# Patient Record
Sex: FEMALE | Race: WHITE | NOT HISPANIC OR LATINO | Employment: STUDENT | URBAN - METROPOLITAN AREA
[De-identification: names, ages, dates, MRNs, and addresses within clinical notes are randomized per-mention and may not be internally consistent; named-entity substitution may affect disease eponyms.]

---

## 2021-08-26 ENCOUNTER — EH NON-PROVIDER (OUTPATIENT)
Dept: OCCUPATIONAL MEDICINE | Facility: CLINIC | Age: 21
End: 2021-08-26

## 2021-08-26 DIAGNOSIS — Z02.89 ENCOUNTER FOR OCCUPATIONAL HEALTH ASSESSMENT: ICD-10-CM

## 2021-08-31 ENCOUNTER — HOSPITAL ENCOUNTER (OUTPATIENT)
Facility: MEDICAL CENTER | Age: 21
End: 2021-08-31
Attending: NURSE PRACTITIONER
Payer: COMMERCIAL

## 2021-08-31 ENCOUNTER — EH NON-PROVIDER (OUTPATIENT)
Dept: OCCUPATIONAL MEDICINE | Facility: CLINIC | Age: 21
End: 2021-08-31

## 2021-08-31 DIAGNOSIS — Z02.89 ENCOUNTER FOR OCCUPATIONAL HEALTH ASSESSMENT: ICD-10-CM

## 2021-08-31 PROCEDURE — 86480 TB TEST CELL IMMUN MEASURE: CPT | Performed by: NURSE PRACTITIONER

## 2021-09-02 LAB
GAMMA INTERFERON BACKGROUND BLD IA-ACNC: 0.06 IU/ML
M TB IFN-G BLD-IMP: NEGATIVE
M TB IFN-G CD4+ BCKGRND COR BLD-ACNC: 0.23 IU/ML
MITOGEN IGNF BCKGRD COR BLD-ACNC: 7.8 IU/ML
QFT TB2 - NIL TBQ2: 0.33 IU/ML

## 2021-09-13 ENCOUNTER — EH NON-PROVIDER (OUTPATIENT)
Dept: OCCUPATIONAL MEDICINE | Facility: CLINIC | Age: 21
End: 2021-09-13
Payer: COMMERCIAL

## 2021-09-13 DIAGNOSIS — Z71.85 IMMUNIZATION COUNSELING: ICD-10-CM

## 2021-10-13 ENCOUNTER — EH NON-PROVIDER (OUTPATIENT)
Dept: OCCUPATIONAL MEDICINE | Facility: CLINIC | Age: 21
End: 2021-10-13
Payer: COMMERCIAL

## 2021-10-13 PROCEDURE — 94375 RESPIRATORY FLOW VOLUME LOOP: CPT | Performed by: NURSE PRACTITIONER

## 2022-09-05 ENCOUNTER — HOSPITAL ENCOUNTER (EMERGENCY)
Facility: MEDICAL CENTER | Age: 22
End: 2022-09-06
Attending: EMERGENCY MEDICINE
Payer: COMMERCIAL
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
## 2022-09-06 VITALS
HEIGHT: 59 IN | BODY MASS INDEX: 37.56 KG/M2 | HEART RATE: 89 BPM | WEIGHT: 186.29 LBS | RESPIRATION RATE: 16 BRPM | OXYGEN SATURATION: 98 % | TEMPERATURE: 98.1 F | DIASTOLIC BLOOD PRESSURE: 82 MMHG | SYSTOLIC BLOOD PRESSURE: 134 MMHG

## 2022-09-06 PROCEDURE — 90471 IMMUNIZATION ADMIN: CPT

## 2022-09-06 PROCEDURE — 90715 TDAP VACCINE 7 YRS/> IM: CPT | Performed by: EMERGENCY MEDICINE

## 2022-09-06 PROCEDURE — 99282 EMERGENCY DEPT VISIT SF MDM: CPT

## 2022-09-06 PROCEDURE — 700111 HCHG RX REV CODE 636 W/ 250 OVERRIDE (IP): Performed by: EMERGENCY MEDICINE

## 2022-09-06 RX ORDER — AMOXICILLIN AND CLAVULANATE POTASSIUM 875; 125 MG/1; MG/1
1 TABLET, FILM COATED ORAL 2 TIMES DAILY
Qty: 14 TABLET | Refills: 0 | Status: SHIPPED | OUTPATIENT
Start: 2022-09-06 | End: 2022-09-06 | Stop reason: SDUPTHER

## 2022-09-06 RX ORDER — AMOXICILLIN AND CLAVULANATE POTASSIUM 875; 125 MG/1; MG/1
1 TABLET, FILM COATED ORAL 2 TIMES DAILY
Qty: 14 TABLET | Refills: 0 | Status: SHIPPED | OUTPATIENT
Start: 2022-09-06 | End: 2022-09-13

## 2022-09-06 RX ADMIN — CLOSTRIDIUM TETANI TOXOID ANTIGEN (FORMALDEHYDE INACTIVATED), CORYNEBACTERIUM DIPHTHERIAE TOXOID ANTIGEN (FORMALDEHYDE INACTIVATED), BORDETELLA PERTUSSIS TOXOID ANTIGEN (GLUTARALDEHYDE INACTIVATED), BORDETELLA PERTUSSIS FILAMENTOUS HEMAGGLUTININ ANTIGEN (FORMALDEHYDE INACTIVATED), BORDETELLA PERTUSSIS PERTACTIN ANTIGEN, AND BORDETELLA PERTUSSIS FIMBRIAE 2/3 ANTIGEN 0.5 ML: 5; 2; 2.5; 5; 3; 5 INJECTION, SUSPENSION INTRAMUSCULAR at 00:23

## 2022-09-06 NOTE — ED PROVIDER NOTES
ED Provider Note    CHIEF COMPLAINT  Chief Complaint   Patient presents with    Wound Check     Pt was bit or scratched by a puppy yesterday on the back of her left lower leg. Small bruise and scab noted to area. No redness, swelling, or drainage. Pt is concerned because she is unsure of vaccinations.         HPI  Nilam Cabral is a 22 y.o. female who presents for evaluation of a small dog bite to the back of her left lower leg.  Patient notes she thinks she was either bitten or scratched by a small puppy on a leash in [place] yesterday.  She notes a small punctate wound with a small amount of bruising around it.  She describes no fevers, chills, erythema, or swelling and has no other injuries.  She notes that while the puppy was on a leash, she does not know the immunization status as if she does not know the owners.    REVIEW OF SYSTEMS  Constitutional: No fevers or chills  Skin: Small punctate wound to the back left calf  Musculoskeletal: Small wound to the back left calf.  Very mild surrounding tenderness and pain  Heme: No bleeding or bruising problems.   Immuno: No hx of recurrent infections    PAST FAM HISTORY  History reviewed. No pertinent family history.    PAST MEDICAL HISTORY  No significant past medical history    SOCIAL HISTORY  Social History     Tobacco Use    Smoking status: Never    Smokeless tobacco: Never   Vaping Use    Vaping Use: Never used   Substance and Sexual Activity    Alcohol use: Not Currently    Drug use: Never    Sexual activity: Not on file       SURGICAL HISTORY  patient denies any surgical history    CURRENT MEDICATIONS  Home Medications       Reviewed by Brandy Price R.N. (Registered Nurse) on 09/05/22 at 2344  Med List Status: Not Addressed     Medication Last Dose Status        Patient Lon Taking any Medications                           ALLERGIES  No Known Allergies    PHYSICAL EXAM  VITAL SIGNS: /82   Pulse 89   Temp 36.7 °C (98.1 °F)  "(Temporal)   Resp 16   Ht 1.499 m (4' 11\")   Wt 84.5 kg (186 lb 4.6 oz)   LMP 08/31/2022 (Approximate)   SpO2 98%   BMI 37.63 kg/m²    Gen: Alert in no apparent distress.  HEENT: No signs of trauma, Bilateral external ears normal, Nose normal. Conjunctiva normal, Non-icteric.   Cardiovascular: Regular rate and rhythm.   Skin: Warm, Dry.  Wound as noted below in extremity exam  Extremities: Intact distal pulses, No edema.  1 mm punctate appearing wound to the posterior lateral mid calf on the left lower extremity.  There is a tiny halo of surrounding purplish ecchymosis but no erythema, induration, or significant tenderness.      COURSE & MEDICAL DECISION MAKING  Patient arrives for evaluation of a tiny wound to the back of her left calf which may be from a puppy bite.  The puppy was on a leash with its owner but the immunization status is unclear.  Patient notes that she herself has not had a Tdap since 2013 and this will be updated today.  Given this scenario, I do not feel rabies prophylaxis is necessary.  Likewise, there does not appear to be a significant infectious issue and I do not suspect a retained tooth.  I did discuss the option of being discharged with a prescription for Augmentin to hold onto.  If she develops symptoms within a few days she can fill and take the prescription.  Otherwise I feel she can safely discard the prescription and treat symptomatically.  Patient states understanding of this.  I did also speak with her mother who is an ED nurse in [place].  She is in agreement with this plan.    FINAL IMPRESSION  1. Dog bite, initial encounter        Electronically signed by: Ishmael Davis M.D., 9/6/2022 12:16 AM   "

## 2022-09-06 NOTE — ED TRIAGE NOTES
"Chief Complaint   Patient presents with    Wound Check     Pt was bit or scratched by a puppy yesterday on the back of her left lower leg. Small bruise and scab noted to area. No redness, swelling, or drainage. Pt is concerned because she is unsure of vaccinations.       Pt ambulatory to triage for above complaint. Pt educated on triage process and informed to alert staff of any changes or concerns.    BP (!) 149/103   Pulse (!) 103   Temp 36.5 °C (97.7 °F) (Temporal)   Resp 16   Ht 1.499 m (4' 11\")   Wt 84.5 kg (186 lb 4.6 oz)   LMP 08/31/2022 (Approximate)   SpO2 99%   BMI 37.63 kg/m²     "

## 2022-09-06 NOTE — ED NOTES
Discharge education provided. Discharge paperwork signed by pt. Prescription given to PT, to be picked up by pt. All questions answered. All belongings with pt. Pt ambulated to lobby unassisted with steady gait.